# Patient Record
Sex: MALE | Race: ASIAN | NOT HISPANIC OR LATINO | URBAN - METROPOLITAN AREA
[De-identification: names, ages, dates, MRNs, and addresses within clinical notes are randomized per-mention and may not be internally consistent; named-entity substitution may affect disease eponyms.]

---

## 2019-10-05 ENCOUNTER — EMERGENCY (EMERGENCY)
Facility: HOSPITAL | Age: 39
LOS: 1 days | Discharge: HOME | End: 2019-10-05
Attending: STUDENT IN AN ORGANIZED HEALTH CARE EDUCATION/TRAINING PROGRAM | Admitting: STUDENT IN AN ORGANIZED HEALTH CARE EDUCATION/TRAINING PROGRAM
Payer: MEDICAID

## 2019-10-05 VITALS
HEART RATE: 67 BPM | TEMPERATURE: 98 F | SYSTOLIC BLOOD PRESSURE: 120 MMHG | RESPIRATION RATE: 16 BRPM | OXYGEN SATURATION: 100 % | DIASTOLIC BLOOD PRESSURE: 85 MMHG

## 2019-10-05 PROCEDURE — 99283 EMERGENCY DEPT VISIT LOW MDM: CPT

## 2019-10-05 RX ORDER — POLYMYXIN B SULF/TRIMETHOPRIM 10000-1/ML
1 DROPS OPHTHALMIC (EYE) ONCE
Refills: 0 | Status: COMPLETED | OUTPATIENT
Start: 2019-10-05 | End: 2019-10-05

## 2019-10-05 RX ADMIN — Medication 1 DROP(S): at 07:16

## 2019-10-05 NOTE — ED PROVIDER NOTE - PHYSICAL EXAMINATION
Physical Exam:  Gen: NAD, AOx3, non-toxic appearing, able to ambulate without assistance  Head: NCAT  HEENT: EOMI, PEERLA, conjunctiva injected left eye perilimbic spairing, tongue midline, oral mucosa moist  1. Visual acuity intact  2. Pupils without anisocoria, has normal reactivity  3. Extraocular motility and alignment intact  4. Intraocular pressure- tonometry not measured  5. Confrontation visual fields intact  7. Slit-lamp examination-   Lids/lashes/lacrimal system: Normal anatomy and contours   Conjunctiva/sclera: White and quiet   Cornea: Clear      Iris: Round pupil   Lens: Clear        fluorecin stain with no corneal abrasions or sidel signLung: CTAB, no respiratory distress, no wheezes/rhonchi/rales B/L, speaking in full sentences  CV: RRR, no murmurs, rubs or gallops  Abd: soft, NT, ND, no guarding, no rigidity, no rebound tenderness, no CVA tenderness   MSK: no visible deformities, ROM normal in UE/LE, no back pain  Neuro: No focal sensory or motor deficits  Skin: Warm, well perfused, no rash, no leg swelling  Psych: normal affect, calm  ~Adam Stephen D.O. -Resident

## 2019-10-05 NOTE — ED PROVIDER NOTE - CLINICAL SUMMARY MEDICAL DECISION MAKING FREE TEXT BOX
37 yo m pw eye redness since last night. reports itchiness and redness. sick nephew with cough. also w rhinorrhea. flurocein exam, dc with art tears and polytrim optho fu

## 2019-10-05 NOTE — ED PROVIDER NOTE - NS ED ROS FT

## 2019-10-05 NOTE — ED PROVIDER NOTE - ATTENDING CONTRIBUTION TO CARE
38M no PMH p/w L eye pain and redness. Pt noted the symptoms around 2000 last night and made it difficult to sleep. Denies trauma or foreign body. Reports worsening of pain with blinking. No vision deficits or blurry vision. Denies fever/chills. Reports runny nose. No cough or congestion. No recent contact with people with similar symptoms.    Gen: nad  HEENT: erythema of R eye with non-painful EOM, PERRL b/l, vision 20/20 b/l; L eye slightly injected, less so than R; runny nose   CV: rrr, no murmurs  Pulm: clear lungs    MDM: 38M p/w one day of L eye pain and injection, likely 2/2 viral conjuncitivits - will check fluorescein to assess for possible corneal abrasion but low suspicion or this

## 2019-10-05 NOTE — ED ADULT TRIAGE NOTE - CHIEF COMPLAINT QUOTE
pain left eye    c/o pain and very slight blurriness to left eye since yesterday.  slightly more reddened than right eye.

## 2019-10-05 NOTE — ED PROVIDER NOTE - PATIENT PORTAL LINK FT
You can access the FollowMyHealth Patient Portal offered by [facility] by registering at the following website: [email or web address]. By joining [facility]’s FollowMyHealth portal, you will also be able to view your health information using other applications (apps) compatible with our system.

## 2019-10-05 NOTE — ED PROVIDER NOTE - NSFOLLOWUPINSTRUCTIONS_ED_ALL_ED_FT
Conjunctivitis    Conjunctivitis is an inflammation of the clear membrane that covers the white part of your eye and the inner surface of your eyelid (conjunctiva). Symptoms include eye redness, eye pain, tearing and drainage, crusting of eyelids, swollen eyelids, and light sensitivity. Conjunctivitis may be contagious and easily spread from person to person. It can be caused by a virus, bacteria, or as part of an allergic reaction; the treatment depends on the type of conjunctivitis suspected. Avoid touching or rubbing your eyes and wipe away any drainage gently with a warm wet washcloth. Do not wear contact lenses until the inflammation is gone – wear glasses until your health care provider says it is safe to wear contact again. Do not share towels or washcloths that may spread the infection and wash your hands frequently.    SEEK IMMEDIATE MEDICAL CARE IF YOU HAVE ANY OF THE FOLLOWING SYMPTOMS: increasing pain, blurry vision, blindness, fever, or facial pain/redness/swelling.     1. TAKE ALL MEDICATIONS AS DIRECTED.    2. FOR PAIN OR FEVER YOU CAN TAKE IBUPROFEN (MOTRIN, ADVIL) OR ACETAMINOPHEN (TYLENOL) AS NEEDED, AS DIRECTED ON PACKAGING.  3. FOLLOW UP WITH OPHTHALMOLOGY WITHIN 3 DAYS AS DIRECTED.  4. IF YOU HAD LABS OR IMAGING DONE, YOU WERE GIVEN COPIES OF ALL LABS AND/OR IMAGING RESULTS FROM YOUR ER VISIT--PLEASE TAKE THEM WITH YOU TO YOUR FOLLOW UP APPOINTMENTS.  5. IF NEEDED, CALL PATIENT ACCESS SERVICES AT [phone] TO FIND A PRIMARY CARE PHYSICIAN.  OR CALL [phone] TO MAKE AN APPOINTMENT WITH THE CLINIC.  6. RETURN TO THE ER FOR ANY WORSENING SYMPTOMS OR CONCERNS.

## 2019-10-05 NOTE — ED PROVIDER NOTE - NSFOLLOWUPCLINICS_GEN_ALL_ED_FT
[facility]  Ophthalmology  [address] [place] [address] [id number]  [place] [address]  Phone: [phone]  Fax:   Follow Up Time: 1-3 Days

## 2019-10-05 NOTE — ED PROVIDER NOTE - OBJECTIVE STATEMENT
39 yo m pw eye redness since last night. reports itchiness and redness. sick nephew with cough. also w rhinorrhea. Denies recent trauma, fevers, chills, headache, dizziness, nausea, vomiting, dysuria, freq, hematuria, diarrhea, constipation, chest pain, shortness of breath, cough. no vision changes or trauma to eye

## 2019-10-08 ENCOUNTER — APPOINTMENT (OUTPATIENT)
Dept: OPHTHALMOLOGY | Facility: CLINIC | Age: 39
End: 2019-10-08
Payer: MEDICAID

## 2019-10-08 ENCOUNTER — NON-APPOINTMENT (OUTPATIENT)
Age: 39
End: 2019-10-08

## 2019-10-08 PROBLEM — Z00.00 ENCOUNTER FOR PREVENTIVE HEALTH EXAMINATION: Status: ACTIVE | Noted: 2019-10-08

## 2019-10-08 PROCEDURE — 76514 ECHO EXAM OF EYE THICKNESS: CPT

## 2019-10-08 PROCEDURE — 92020 GONIOSCOPY: CPT

## 2019-10-08 PROCEDURE — 92002 INTRM OPH EXAM NEW PATIENT: CPT

## 2019-10-29 ENCOUNTER — APPOINTMENT (OUTPATIENT)
Dept: OPHTHALMOLOGY | Facility: CLINIC | Age: 39
End: 2019-10-29
Payer: MEDICAID

## 2019-10-29 ENCOUNTER — NON-APPOINTMENT (OUTPATIENT)
Age: 39
End: 2019-10-29

## 2019-10-29 PROCEDURE — 92133 CPTRZD OPH DX IMG PST SGM ON: CPT

## 2019-10-29 PROCEDURE — 92083 EXTENDED VISUAL FIELD XM: CPT

## 2019-10-29 PROCEDURE — 92012 INTRM OPH EXAM EST PATIENT: CPT

## 2020-03-12 ENCOUNTER — APPOINTMENT (OUTPATIENT)
Dept: UROLOGY | Facility: CLINIC | Age: 40
End: 2020-03-12
Payer: MEDICAID

## 2020-03-12 VITALS
WEIGHT: 220 LBS | DIASTOLIC BLOOD PRESSURE: 73 MMHG | TEMPERATURE: 98.2 F | HEIGHT: 74 IN | OXYGEN SATURATION: 97 % | SYSTOLIC BLOOD PRESSURE: 106 MMHG | BODY MASS INDEX: 28.23 KG/M2 | HEART RATE: 73 BPM

## 2020-03-12 DIAGNOSIS — N46.01 ORGANIC AZOOSPERMIA: ICD-10-CM

## 2020-03-12 DIAGNOSIS — Z84.1 FAMILY HISTORY OF DISORDERS OF KIDNEY AND URETER: ICD-10-CM

## 2020-03-12 DIAGNOSIS — N46.9 MALE INFERTILITY, UNSPECIFIED: ICD-10-CM

## 2020-03-12 PROCEDURE — 99203 OFFICE O/P NEW LOW 30 MIN: CPT

## 2020-03-12 NOTE — PHYSICAL EXAM
[General Appearance - Well Developed] : well developed [General Appearance - Well Nourished] : well nourished [Normal Appearance] : normal appearance [Well Groomed] : well groomed [General Appearance - In No Acute Distress] : no acute distress [Edema] : no peripheral edema [Exaggerated Use Of Accessory Muscles For Inspiration] : no accessory muscle use [Bowel Sounds] : normal bowel sounds [Abdomen Tenderness] : non-tender [Urethral Meatus] : meatus normal [Penis Abnormality] : normal circumcised penis [Epididymis] : the epididymides were normal [Testes Tenderness] : no tenderness of the testes [Testes Mass (___cm)] : there were no testicular masses [Normal Station and Gait] : the gait and station were normal for the patient's age [Skin Color & Pigmentation] : normal skin color and pigmentation [No Focal Deficits] : no focal deficits [Mood] : the mood was normal [Inguinal Lymph Nodes Enlarged Bilaterally] : inguinal [FreeTextEntry1] : 3.5x 2 bilaterally; vas x 2; no varicocele;

## 2020-03-12 NOTE — ASSESSMENT
[FreeTextEntry1] : Patient is a 39-year-old  who presents with his 35-year-old wife.  He is has a long history of azoospermia.  Is been evaluated at [facility].  He was instructed to take Clomid 25 mg daily.  After 5 months he continued to be azoospermic.  He reports that he had genetic studies which were reported as being normal.  It was recommended that he undergo microTESE with in vitro fertilization he chose not to proceed due to the financial burden.  He has elevated FSH.  LH and T not available\par \par Biopsy had been performed on February 13, 2012 at [facility].  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 microtese\par 2. donor\par 3. adoption\par Discussed risks benefits success of each\par Couple not willing to proceed with options as outlined\par \par Routine prn or if wishes to proceed\par \par

## 2020-03-12 NOTE — ASSESSMENT
[FreeTextEntry1] : Patient is a 39-year-old  who presents with his 35-year-old wife.  He is has a long history of azoospermia.  Is been evaluated at [facility].  He was instructed to take Clomid 25 mg daily.  After 5 months he continued to be azoospermic.  He reports that he had genetic studies which were reported as being normal.  It was recommended that he undergo microTESE with in vitro fertilization he chose not to proceed due to the financial burden.  He has elevated FSH.  LH and T not available\par \par Biopsy had been performed on February 13, 2012 at [facility].  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 microtese\par 2. donor\par 3. adoption\par Discussed risks benefits success of each\par Couple not willing to proceed with options as outlined\par \par Routine prn or if wishes to proceed\par \par

## 2020-03-12 NOTE — HISTORY OF PRESENT ILLNESS
[Currently Experiencing ___] :  [unfilled] [FreeTextEntry1] : Patient\par Job: drive\par Partner Name:Zakia Mitchell\par Partner Age: 36\par Prior marriage:no\par Prior Pregnancy no\par Duration of Relationship: 10 year\par Years unprotected sexual intercourse:no\par Time [place]: no\par Sexual dysfunction: no\par Exposure history: no tobacco, ETOH drugs\par

## 2020-03-12 NOTE — HISTORY OF PRESENT ILLNESS
[Currently Experiencing ___] :  [unfilled] [FreeTextEntry1] : Patient\par Job: drive\par Partner Name:Zakia Mitchell\par Partner Age: 36\par Prior marriage:no\par Prior Pregnancy no\par Duration of Relationship: 10 year\par Years unprotected sexual intercourse:no\par Time [place]: no\par Sexual dysfunction: no\par Exposure history: no tobacco, ETOH drugs\par

## 2020-03-13 DIAGNOSIS — E29.1 TESTICULAR HYPOFUNCTION: ICD-10-CM

## 2020-03-13 LAB
ESTRADIOL SERPL-MCNC: 17 PG/ML
LH SERPL-ACNC: 6.2 IU/L
TESTOST SERPL-MCNC: 141 NG/DL

## 2020-07-26 ENCOUNTER — EMERGENCY (EMERGENCY)
Facility: HOSPITAL | Age: 40
LOS: 1 days | Discharge: HOME | End: 2020-07-26
Attending: STUDENT IN AN ORGANIZED HEALTH CARE EDUCATION/TRAINING PROGRAM | Admitting: STUDENT IN AN ORGANIZED HEALTH CARE EDUCATION/TRAINING PROGRAM
Payer: MEDICAID

## 2020-07-26 VITALS
SYSTOLIC BLOOD PRESSURE: 111 MMHG | DIASTOLIC BLOOD PRESSURE: 77 MMHG | HEART RATE: 74 BPM | TEMPERATURE: 98 F | OXYGEN SATURATION: 100 % | RESPIRATION RATE: 18 BRPM

## 2020-07-26 PROCEDURE — 73562 X-RAY EXAM OF KNEE 3: CPT | Mod: 26,RT

## 2020-07-26 PROCEDURE — 99283 EMERGENCY DEPT VISIT LOW MDM: CPT

## 2020-07-26 RX ORDER — ACETAMINOPHEN 500 MG
975 TABLET ORAL ONCE
Refills: 0 | Status: COMPLETED | OUTPATIENT
Start: 2020-07-26 | End: 2020-07-26

## 2020-07-26 RX ORDER — LIDOCAINE 4 G/100G
1 CREAM TOPICAL ONCE
Refills: 0 | Status: COMPLETED | OUTPATIENT
Start: 2020-07-26 | End: 2020-07-26

## 2020-07-26 RX ORDER — OXYCODONE HYDROCHLORIDE 5 MG/1
5 TABLET ORAL ONCE
Refills: 0 | Status: DISCONTINUED | OUTPATIENT
Start: 2020-07-26 | End: 2020-07-26

## 2020-07-26 RX ADMIN — Medication 975 MILLIGRAM(S): at 14:58

## 2020-07-26 RX ADMIN — LIDOCAINE 1 PATCH: 4 CREAM TOPICAL at 14:58

## 2020-07-26 RX ADMIN — OXYCODONE HYDROCHLORIDE 5 MILLIGRAM(S): 5 TABLET ORAL at 14:59

## 2020-07-26 NOTE — ED PROVIDER NOTE - NS ED ROS FT
GENERAL: No fever or chills  EYES: no change in vision  HEENT: no trouble swallowing or speaking  CARDIAC: no chest pain or palpitations  PULMONARY: no cough or SOB  GI: no abdominal pain, nausea, vomiting, diarrhea, or constipation   : No changes in urination  SKIN: no rashes  NEURO: no headache, numbness, or weakness  MSK: right knee pain

## 2020-07-26 NOTE — ED PROVIDER NOTE - PROGRESS NOTE DETAILS
Jaden GUDINO MD PGY3: XR normal. Still with difficulty walking around. Will d/c home with pain control, and ortho referral list. Will wrap with ACE wrap and give crutches.

## 2020-07-26 NOTE — ED PROVIDER NOTE - NSFOLLOWUPINSTRUCTIONS_ED_ALL_ED_FT
Please return to the ED for any calf swelling, worsening pain, chest pain or shortness of breath.     Please follow-up with an orthopedist in the next week.     You may take tylenol 1000mg and ibuprofen 400mg every 6 hours as needed for pain.

## 2020-07-26 NOTE — ED PROVIDER NOTE - OBJECTIVE STATEMENT
39M with no pmh presenting with right knee pain since yesterday. States was playing cricket and while running twisted his knee with difficulty walking since. Tried taking 600mg ibuprofen with no significant relief. Denies any other complaints. Denies any other trauma or injury.

## 2020-07-26 NOTE — ED PROVIDER NOTE - ATTENDING CONTRIBUTION TO CARE
39M with no pmh presenting with right knee pain since yesterday. States was playing cricket and while running twisted his knee with difficulty walking since. Tried taking 600mg ibuprofen with no significant relief. Denies any other complaints. Denies any other trauma or injury.    Patient presenting knee injury while running with twisting motion. Knee with full ROM with pain and mild diffuse swelling. Will obtain screening XR. Likely knee sprain. Will treat pain, reassess

## 2020-07-26 NOTE — ED PROVIDER NOTE - PATIENT PORTAL LINK FT
You can access the FollowMyHealth Patient Portal offered by [facility] by registering at the following website: [email or web address]. By joining [facility]’s FollowMyHealth portal, you will also be able to view your health information using other applications (apps) compatible with our system.

## 2020-07-26 NOTE — ED PROVIDER NOTE - NS ED MD EM SELECTION
Thank you for choosing the [facility], Marti Mendoza MD, and our team as your Neurology Specialists.  We actively use feedback to constantly improve and deliver the best care possible. To provide the best experience, we are collecting feedback from you on how we performed.  You may receive a survey in the mail to evaluate how we did. Please take a moment and share your thoughts.      If for any reason you feel that we did not meet your expectations or you want to share a positive experience, please give us a call. Your feedback helps us know how we are doing and what we can be doing better.    Office hours: 8:00 am to 4:30 pm, Monday - Friday  Phone: [phone]     [id number] Detailed

## 2020-07-26 NOTE — ED PROVIDER NOTE - PHYSICAL EXAMINATION
GEN: NAD, awake, well appearing  HEENT: NCAT, MMM, normal conjunctiva, perrl  CHEST/LUNGS: Non-tachypneic, CTAB, bilateral breath sounds  CARDIAC: Non-tachycardic, s1s2, normal perfusion, no peripheral edema  ABDOMEN: Soft, NTND, No rebound/guarding  MSK: mild diffuse right knee swelling, no crepitus, full ROM of knee passive/active, no significant joint laxity, neurovasc intact, no gross deformity of extremities  SKIN: No rashes, no petechiae, no vesicles  NEURO: CN grossly intact, normal coordination, no focal motor or sensory deficits

## 2020-07-27 PROBLEM — Z78.9 OTHER SPECIFIED HEALTH STATUS: Chronic | Status: ACTIVE | Noted: 2020-07-26

## 2020-07-31 ENCOUNTER — APPOINTMENT (OUTPATIENT)
Dept: ORTHOPEDIC SURGERY | Facility: CLINIC | Age: 40
End: 2020-07-31
Payer: MEDICAID

## 2020-07-31 VITALS — BODY MASS INDEX: 28.23 KG/M2 | WEIGHT: 220 LBS | HEIGHT: 74 IN

## 2020-07-31 VITALS — TEMPERATURE: 97.4 F

## 2020-07-31 DIAGNOSIS — M25.561 PAIN IN RIGHT KNEE: ICD-10-CM

## 2020-07-31 DIAGNOSIS — S83.411A SPRAIN OF MEDIAL COLLATERAL LIGAMENT OF RIGHT KNEE, INITIAL ENCOUNTER: ICD-10-CM

## 2020-07-31 DIAGNOSIS — M25.461 EFFUSION, RIGHT KNEE: ICD-10-CM

## 2020-07-31 PROCEDURE — 99203 OFFICE O/P NEW LOW 30 MIN: CPT

## 2022-02-21 NOTE — ED ADULT TRIAGE NOTE - CHIEF COMPLAINT QUOTE
pt c/o right knee pain. states he injured self playing cricket yesterday. unable to bear weight. last took 600mg motrin this morning.
negative - no dysuria